# Patient Record
Sex: FEMALE | Race: WHITE | Employment: FULL TIME | URBAN - METROPOLITAN AREA
[De-identification: names, ages, dates, MRNs, and addresses within clinical notes are randomized per-mention and may not be internally consistent; named-entity substitution may affect disease eponyms.]

---

## 2018-06-01 PROBLEM — R91.1 PULMONARY NODULE: Status: ACTIVE | Noted: 2018-06-01

## 2018-07-11 PROBLEM — M35.00 SJOGREN'S SYNDROME (HCC): Status: ACTIVE | Noted: 2018-07-11

## 2018-07-11 PROBLEM — R73.03 PRE-DIABETES: Status: ACTIVE | Noted: 2018-07-11

## 2018-07-11 PROBLEM — E04.1 THYROID NODULE: Status: ACTIVE | Noted: 2018-07-11

## 2018-07-11 PROBLEM — R03.0 PRE-HYPERTENSION: Status: ACTIVE | Noted: 2018-07-11

## 2018-08-18 PROCEDURE — 86803 HEPATITIS C AB TEST: CPT | Performed by: INTERNAL MEDICINE

## 2018-08-20 PROBLEM — N18.30 CKD (CHRONIC KIDNEY DISEASE) STAGE 3, GFR 30-59 ML/MIN (HCC): Status: ACTIVE | Noted: 2018-08-20

## 2018-11-20 ENCOUNTER — LAB ENCOUNTER (OUTPATIENT)
Dept: LAB | Age: 68
End: 2018-11-20
Attending: INTERNAL MEDICINE
Payer: COMMERCIAL

## 2018-11-20 DIAGNOSIS — Z86.2 PERSONAL HISTORY OF DISEASES OF BLOOD AND BLOOD-FORMING ORGANS: ICD-10-CM

## 2018-11-20 DIAGNOSIS — Z79.52 LONG TERM CURRENT USE OF SYSTEMIC STEROIDS: ICD-10-CM

## 2018-11-20 DIAGNOSIS — M35.00 SICCA SYNDROME (HCC): Primary | ICD-10-CM

## 2018-11-20 PROCEDURE — 84443 ASSAY THYROID STIM HORMONE: CPT

## 2018-11-20 PROCEDURE — 86160 COMPLEMENT ANTIGEN: CPT

## 2018-11-20 PROCEDURE — 85025 COMPLETE CBC W/AUTO DIFF WBC: CPT

## 2018-11-20 PROCEDURE — 86235 NUCLEAR ANTIGEN ANTIBODY: CPT

## 2018-11-20 PROCEDURE — 85652 RBC SED RATE AUTOMATED: CPT

## 2018-11-20 PROCEDURE — 86225 DNA ANTIBODY NATIVE: CPT

## 2018-11-20 PROCEDURE — 86038 ANTINUCLEAR ANTIBODIES: CPT

## 2018-11-20 PROCEDURE — 80053 COMPREHEN METABOLIC PANEL: CPT

## 2018-11-29 PROBLEM — M81.0 OSTEOPOROSIS: Status: ACTIVE | Noted: 2018-11-29

## 2019-01-21 PROBLEM — E78.5 HYPERLIPIDEMIA, UNSPECIFIED HYPERLIPIDEMIA TYPE: Status: ACTIVE | Noted: 2019-01-21

## 2019-02-12 PROBLEM — I51.89 DIASTOLIC DYSFUNCTION: Status: ACTIVE | Noted: 2019-02-12

## 2019-02-12 PROBLEM — I35.8 AORTIC VALVE SCLEROSIS: Status: ACTIVE | Noted: 2019-02-12

## 2019-02-12 PROBLEM — I51.7 MILD CONCENTRIC LEFT VENTRICULAR HYPERTROPHY: Status: ACTIVE | Noted: 2019-02-12

## 2019-10-12 PROCEDURE — 82784 ASSAY IGA/IGD/IGG/IGM EACH: CPT | Performed by: SPECIALIST

## 2019-10-12 PROCEDURE — 86256 FLUORESCENT ANTIBODY TITER: CPT | Performed by: SPECIALIST

## 2019-10-14 PROCEDURE — 88305 TISSUE EXAM BY PATHOLOGIST: CPT | Performed by: SPECIALIST

## 2019-10-24 PROBLEM — Z86.79 HISTORY OF ATRIAL TACHYCARDIA: Status: ACTIVE | Noted: 2019-10-24

## 2019-10-24 PROBLEM — N18.30 CKD (CHRONIC KIDNEY DISEASE) STAGE 3, GFR 30-59 ML/MIN (HCC): Status: RESOLVED | Noted: 2018-08-20 | Resolved: 2019-10-24

## 2019-10-24 PROBLEM — R73.03 PRE-DIABETES: Status: RESOLVED | Noted: 2018-07-11 | Resolved: 2019-10-24

## 2020-03-02 PROBLEM — M85.80 OSTEOPENIA: Status: ACTIVE | Noted: 2020-03-02

## 2020-10-21 PROBLEM — I47.19 ATRIAL TACHYCARDIA (HCC): Status: ACTIVE | Noted: 2020-10-21

## 2020-10-21 PROBLEM — D58.2 ELEVATED HEMOGLOBIN: Status: ACTIVE | Noted: 2020-10-21

## 2020-10-21 PROBLEM — I47.1 ATRIAL TACHYCARDIA (HCC): Status: ACTIVE | Noted: 2020-10-21

## 2020-10-21 PROBLEM — M35.00 SICCA (HCC): Status: ACTIVE | Noted: 2020-10-21

## 2020-10-21 PROBLEM — D58.2 ELEVATED HEMOGLOBIN (HCC): Status: ACTIVE | Noted: 2020-10-21

## 2020-11-09 PROBLEM — D58.2 ELEVATED HEMOGLOBIN (HCC): Status: RESOLVED | Noted: 2020-10-21 | Resolved: 2020-11-09

## 2020-11-09 PROBLEM — D58.2 ELEVATED HEMOGLOBIN: Status: RESOLVED | Noted: 2020-10-21 | Resolved: 2020-11-09

## 2021-10-20 PROBLEM — R73.03 PRE-DIABETES: Status: ACTIVE | Noted: 2021-10-20

## 2021-11-02 PROBLEM — I70.0 AORTO-ILIAC ATHEROSCLEROSIS: Status: ACTIVE | Noted: 2021-11-02

## 2021-11-02 PROBLEM — Z86.79 HISTORY OF ATRIAL TACHYCARDIA: Status: RESOLVED | Noted: 2019-10-24 | Resolved: 2021-11-02

## 2021-11-02 PROBLEM — I70.8 AORTO-ILIAC ATHEROSCLEROSIS (HCC): Status: ACTIVE | Noted: 2021-11-02

## 2021-11-02 PROBLEM — M81.0 OSTEOPOROSIS: Status: RESOLVED | Noted: 2018-11-29 | Resolved: 2021-11-02

## 2021-11-02 PROBLEM — I70.8 AORTO-ILIAC ATHEROSCLEROSIS: Status: ACTIVE | Noted: 2021-11-02

## 2021-11-02 PROBLEM — I47.19 ATRIAL TACHYCARDIA (HCC): Status: RESOLVED | Noted: 2020-10-21 | Resolved: 2021-11-02

## 2021-11-02 PROBLEM — I47.1 PAROXYSMAL ATRIAL TACHYCARDIA (HCC): Status: ACTIVE | Noted: 2020-10-21

## 2021-11-02 PROBLEM — I70.0 AORTO-ILIAC ATHEROSCLEROSIS (HCC): Status: ACTIVE | Noted: 2021-11-02

## 2021-11-02 PROBLEM — I47.19 PAROXYSMAL ATRIAL TACHYCARDIA (HCC): Status: ACTIVE | Noted: 2020-10-21

## 2021-11-02 PROBLEM — I47.1 ATRIAL TACHYCARDIA (HCC): Status: RESOLVED | Noted: 2020-10-21 | Resolved: 2021-11-02

## 2021-11-03 PROBLEM — E78.5 DYSLIPIDEMIA (HIGH LDL; LOW HDL): Status: ACTIVE | Noted: 2021-11-03

## 2021-11-03 PROBLEM — I47.19 PAROXYSMAL ATRIAL TACHYCARDIA (HCC): Status: RESOLVED | Noted: 2020-10-21 | Resolved: 2021-11-03

## 2021-11-03 PROBLEM — I47.1 PAROXYSMAL ATRIAL TACHYCARDIA (HCC): Status: RESOLVED | Noted: 2020-10-21 | Resolved: 2021-11-03

## 2022-12-19 ENCOUNTER — LAB ENCOUNTER (OUTPATIENT)
Dept: LAB | Age: 72
End: 2022-12-19
Attending: OTOLARYNGOLOGY
Payer: MEDICARE

## 2022-12-19 DIAGNOSIS — Z01.818 PREOP EXAMINATION: ICD-10-CM

## 2022-12-19 DIAGNOSIS — Z11.59 SCREENING FOR VIRAL DISEASE: ICD-10-CM

## 2022-12-20 LAB — SARS-COV-2 RNA RESP QL NAA+PROBE: NOT DETECTED

## 2022-12-22 ENCOUNTER — HOSPITAL ENCOUNTER (OUTPATIENT)
Dept: ULTRASOUND IMAGING | Facility: HOSPITAL | Age: 72
Discharge: HOME | End: 2022-12-22
Attending: OTOLARYNGOLOGY
Payer: MEDICARE

## 2022-12-22 DIAGNOSIS — E07.9 THYROID DYSFUNCTION: ICD-10-CM

## 2022-12-22 DIAGNOSIS — E04.1 THYROID NODULE: ICD-10-CM

## 2022-12-22 PROCEDURE — 88173 CYTOPATH EVAL FNA REPORT: CPT | Performed by: OTOLARYNGOLOGY

## 2022-12-22 PROCEDURE — 10005 FNA BX W/US GDN 1ST LES: CPT | Performed by: OTOLARYNGOLOGY

## 2025-02-12 RX ORDER — MAGNESIUM 200 MG
TABLET ORAL NIGHTLY PRN
COMMUNITY

## 2025-02-12 RX ORDER — ACETAMINOPHEN 500 MG
500 TABLET ORAL EVERY 6 HOURS PRN
COMMUNITY

## 2025-02-12 RX ORDER — NAPROXEN 500 MG/1
500 TABLET ORAL 2 TIMES DAILY WITH MEALS
COMMUNITY

## 2025-02-12 RX ORDER — TIZANIDINE 2 MG/1
2 TABLET ORAL EVERY 6 HOURS PRN
COMMUNITY

## 2025-02-12 RX ORDER — HYDROCHLOROTHIAZIDE 25 MG/1
25 TABLET ORAL DAILY
COMMUNITY

## 2025-02-23 ENCOUNTER — ANESTHESIA EVENT (OUTPATIENT)
Dept: ENDOSCOPY | Facility: HOSPITAL | Age: 75
End: 2025-02-23
Payer: MEDICARE

## 2025-02-24 ENCOUNTER — ANESTHESIA (OUTPATIENT)
Dept: ENDOSCOPY | Facility: HOSPITAL | Age: 75
End: 2025-02-24
Payer: MEDICARE

## 2025-02-24 ENCOUNTER — HOSPITAL ENCOUNTER (OUTPATIENT)
Facility: HOSPITAL | Age: 75
Setting detail: HOSPITAL OUTPATIENT SURGERY
Discharge: HOME | End: 2025-02-24
Attending: INTERNAL MEDICINE | Admitting: INTERNAL MEDICINE
Payer: MEDICARE

## 2025-02-24 VITALS
HEART RATE: 61 BPM | BODY MASS INDEX: 26.19 KG/M2 | OXYGEN SATURATION: 97 % | WEIGHT: 146 LBS | HEIGHT: 62.5 IN | DIASTOLIC BLOOD PRESSURE: 60 MMHG | RESPIRATION RATE: 18 BRPM | SYSTOLIC BLOOD PRESSURE: 163 MMHG | TEMPERATURE: 99 F

## 2025-02-24 PROCEDURE — 88307 TISSUE EXAM BY PATHOLOGIST: CPT | Performed by: INTERNAL MEDICINE

## 2025-02-24 PROCEDURE — 0DB98ZX EXCISION OF DUODENUM, VIA NATURAL OR ARTIFICIAL OPENING ENDOSCOPIC, DIAGNOSTIC: ICD-10-PCS | Performed by: INTERNAL MEDICINE

## 2025-02-24 PROCEDURE — 88305 TISSUE EXAM BY PATHOLOGIST: CPT | Performed by: INTERNAL MEDICINE

## 2025-02-24 PROCEDURE — 0FBG8ZX EXCISION OF PANCREAS, VIA NATURAL OR ARTIFICIAL OPENING ENDOSCOPIC, DIAGNOSTIC: ICD-10-PCS | Performed by: INTERNAL MEDICINE

## 2025-02-24 RX ORDER — INDOMETHACIN 100 MG
SUPPOSITORY, RECTAL RECTAL
Status: DISCONTINUED | OUTPATIENT
Start: 2025-02-24 | End: 2025-02-24

## 2025-02-24 RX ORDER — SODIUM CHLORIDE, SODIUM LACTATE, POTASSIUM CHLORIDE, CALCIUM CHLORIDE 600; 310; 30; 20 MG/100ML; MG/100ML; MG/100ML; MG/100ML
INJECTION, SOLUTION INTRAVENOUS CONTINUOUS
Status: DISCONTINUED | OUTPATIENT
Start: 2025-02-24 | End: 2025-02-24

## 2025-02-24 RX ORDER — LIDOCAINE HYDROCHLORIDE 10 MG/ML
INJECTION, SOLUTION EPIDURAL; INFILTRATION; INTRACAUDAL; PERINEURAL AS NEEDED
Status: DISCONTINUED | OUTPATIENT
Start: 2025-02-24 | End: 2025-02-24 | Stop reason: SURG

## 2025-02-24 RX ORDER — INDOMETHACIN 100 MG
SUPPOSITORY, RECTAL RECTAL
Status: DISCONTINUED
Start: 2025-02-24 | End: 2025-02-24

## 2025-02-24 RX ADMIN — LIDOCAINE HYDROCHLORIDE 25 MG: 10 INJECTION, SOLUTION EPIDURAL; INFILTRATION; INTRACAUDAL; PERINEURAL at 10:10:00

## 2025-02-24 RX ADMIN — SODIUM CHLORIDE, SODIUM LACTATE, POTASSIUM CHLORIDE, CALCIUM CHLORIDE: 600; 310; 30; 20 INJECTION, SOLUTION INTRAVENOUS at 10:07:00

## 2025-02-24 NOTE — ANESTHESIA PREPROCEDURE EVALUATION
PRE-OP EVALUATION    Patient Name: Dorina Garcia    Admit Diagnosis: PANCREATIC DUCT DILATED, WEIGHT LOSS, ABNORMAL CT OF THE ABDOMEN    Pre-op Diagnosis: PANCREATIC DUCT DILATED, WEIGHT LOSS, ABNORMAL CT OF THE ABDOMEN    UPPER ENDOSCOPIC ULTRASOUND (EUS) WITH FINE NEEDLE ASPIRATION    Anesthesia Procedure: UPPER ENDOSCOPIC ULTRASOUND (EUS) WITH FINE NEEDLE ASPIRATION    Surgeons and Role:     * Kevin Gonzalez MD - Primary    Pre-op vitals reviewed.  Temp: 98.8 °F (37.1 °C)  Pulse: 78  Resp: 16  BP: 173/70  SpO2: 96 %  Body mass index is 26.28 kg/m².    Current medications reviewed.  Hospital Medications:   lactated ringers infusion   Intravenous Continuous       Outpatient Medications:   Prescriptions Prior to Admission[1]    Allergies: Doxycycline      Anesthesia Evaluation        Anesthetic Complications  (-) history of anesthetic complications         GI/Hepatic/Renal    Negative GI/hepatic/renal ROS.                             Cardiovascular  Comment: TTE 2019 with normal biventricular fxn, no significant valve dz                (+) hypertension                  (+) dysrhythmias (paroxysmal SVT) and SVT                  Endo/Other           (+) hypothyroidism                       Pulmonary    Negative pulmonary ROS.                       Neuro/Psych                                      Past Surgical History:   Procedure Laterality Date    Foot/toes surgery proc unlisted      bunionectomy & tailor's bunionectomy, b/l    Forearm/wrist surgery unlisted      carpal tunnel syndrome, b/l    Leg/ankle surgery proc unlisted      scope, both knees     Social History     Socioeconomic History    Marital status:    Tobacco Use    Smoking status: Never    Smokeless tobacco: Never   Vaping Use    Vaping status: Never Used   Substance and Sexual Activity    Alcohol use: Not Currently    Drug use: No   Other Topics Concern    Seat Belt Yes     History   Drug Use No     Available pre-op labs reviewed.                Airway      Mallampati: II  Mouth opening: 3 FB  TM distance: 4 - 6 cm  Neck ROM: full Cardiovascular      Rhythm: regular  Rate: normal     Dental    Dentition appears grossly intact         Pulmonary      Breath sounds clear to auscultation bilaterally.               Other findings              ASA: 2   Plan: MAC  NPO status verified and patient meets guidelines.    Post-procedure pain management plan discussed with surgeon and patient.      Plan/risks discussed with: patient                Present on Admission:  **None**             [1]   Medications Prior to Admission   Medication Sig Dispense Refill Last Dose/Taking    naproxen 500 MG Oral Tab Take 1 tablet (500 mg total) by mouth 2 (two) times daily with meals.   2/23/2025    acetaminophen 500 MG Oral Tab Take 1 tablet (500 mg total) by mouth every 6 (six) hours as needed for Pain.   2/23/2025    hydroCHLOROthiazide 25 MG Oral Tab Take 1 tablet (25 mg total) by mouth daily.   2/23/2025    Magnesium 200 MG Oral Tab Take by mouth nightly as needed.   2/23/2025    Romosozumab-aqqg ([facility]) Inject into the skin every 30 (thirty) days. Gets in office   Past Month    UNITHROID 25 MCG Oral Tab Take one tablet by mouth every other day 45 tablet 5 Past Week    PredniSONE 5 MG Oral Tab 1 TABLET DAILY   2/23/2025    Cholecalciferol (VITAMIN D) 2000 UNITS Oral Cap 2 capsules daily   2/23/2025    tiZANidine 2 MG Oral Tab Take 1 tablet (2 mg total) by mouth every 6 (six) hours as needed. Taking half a tablet   More than a month

## 2025-02-24 NOTE — OPERATIVE REPORT
[facility] OPERATIVE REPORT   PATIENT NAME: Dorina Garcia  MRN: [id number]  DATE OF OPERATION: 2/24/2025  PREOPERATIVE DIAGNOSIS: weight loss, pancreatic duct dilation  POSTOPERATIVE DIAGNOSIS:    1.  EGD- hiatal hernia   2.  EUS    - a 7mm pancreatic duct stricture with marked upstream ductal dilation and pancreatic atrophy      - stricture s/p FNB     - single calcification in neck of pancreas    - small hepatic cysts  PROCEDURE PERFORMED: upper endoscopy/ ENDOSCOPIC ultrasound  SEDATION MEDICATIONS: MAC  PREOPERATIVE MEDICATIONS: Unasyn 3gm IVPD; indomethacin 100 mg AK; 500cc Lactated Ringer solution IV  PREPROCEDURE ASSESSMENT: The indication for this procedure is to assess for . The patient was identified by myself and nursing staff in the exam room. Informed consent was obtained. The patient was seen in clinic and a full H&P was obtained. On brief physical examination, airway is patent. Chest is clear. Heart has regular rate and rhythm. Abdomen is soft, nontender with good bowel sounds. A medication list was taken by nursing today and reviewed by myself. The patient is an ASA grade 2.   PROCEDURE NOTE: The procedure was completed without difficulty. The patient tolerated the procedure well.   Upper endoscopy (EGD):  The endoscope was inserted through the mouth and advanced to the level of the duodenum, 3rd portion.  Visualized portion of the esophagus, stomach including antrum, body, fundus and cardiac, and duodenum were normal. Duodenal biopsies were taken.  Endoscopic ultrasound (EUS):  Endoscopic ultrasound was performed using the linear echoendoscope.  Images were obtained.    LIVER: Left lobe of the liver was visualized and no mass or lesions seen except for small hepatic cysts.  No intrahepatic duct dilation was noted.  Portal vein was noted to come out of the liver and the portal confluence was seen and pancreas was noted.   PANCREAS:  Pancreatic neck, body, and tail were interrogated from the  gastric body while the neck, head and uncinate were examined from the 1st and 2nd duodenum.  PD  - neck: 7.1 mm  - body: 5 mm  - tail: 4 mm  - head: 3 mm  Pancreas divisum: no  Chronic pancreatitis changes: possibly; there was a calcification in the neck of the pancreas. This was not intraductal. Since pancreatic parenchyma was atrophic the body and tail, it was hard to say if there was lobulation to suggest chronic pancreatitis.  However, head and uncinate were normal without features of chronic pancreatitis.  Neoplasm:  no; there was a 7mm stricture that was sampled with 25G FNB needle.  Using color flow doppler to make sure there no intervening vessels, a 25G FNB needle was used to pucnture into the lesion. 2 passes were made.  Adequate tissue was obtained for cytology.  Cysts:   no  Biliary Tree: Normal throughout  - stones:  no  GALLBLADDER:  visualized and was normal without stones or sludge  CELIAC AXIS:  visualized without lymphadenopathy  L ADRENAL GLAND:  visualized   L KIDNEY:  visualized   MEDIASTINUM:  visualized without lymphadenopathy  Scope was withdrawn from the patient and patient tolerated the procedure well.  FINDINGS   Pancreas duct stricture in the neck area with marked upstream pancreas ductal dilation of the proximal body.  There was gradual tapering towards the tail.  There was significant pancreatic gland atrophy upstream to the stricture.  The stricture was just proximal to the level of the portal confluence.  No discrete mass was noted.  Biopsies were taken of the stricture area.  Small blood was noted in the pancreatic duct.  No active bleeding seen.  RECOMMENDATIONS: Pancreatic duct stricture of unknown etiology.  There was a single calcification seen in the neck of the pancreas.  No intraductal calcifications to suggest ductal stones.  My concern is that patient has early pancreatic neoplasm.  Will review pathology.  Will monitor HbA1c closely.  Close surveillance will be needed if  pathology is unremarkable  DISCHARGE:  On discharge, the patient was given an after-visit summary detailing the procedure, findings, followup plans, and an updated medication list.     Thank you very much for the consultation.  I really appreciate it.    Kevin Gonzalez MD

## 2025-02-24 NOTE — ANESTHESIA POSTPROCEDURE EVALUATION
[facility]    Dorina Garcia Patient Status:  Hospital Outpatient Surgery   Age/Gender 74 year old female MRN [id number]   Location [facility] ENDOSCOPY [facility] Attending Kevin Gonzalez MD   Hosp Day # 0 PCP Irina Cortez MD       Anesthesia Post-op Note    UPPER ENDOSCOPIC ULTRASOUND (EUS) WITH FINE NEEDLE BIOPSY AND ENDOSCOPIC BIOPSIES    Procedure Summary       Date: 02/24/25 Room / Location:  ENDOSCOPY 03 / [facility] END[facility]COPY    Anesthesia Start: 1006 Anesthesia Stop: 1034    Procedure: UPPER ENDOSCOPIC ULTRASOUND (EUS) WITH FINE NEEDLE BIOPSY AND ENDOSCOPIC BIOPSIES Diagnosis: (PANCREATIC DUCT DILATION)    Surgeons: Kevin Gonzalez MD Anesthesiologist: Oscar Finn MD    Anesthesia Type: MAC ASA Status: 2            Anesthesia Type: MAC    Vitals Value Taken Time   /55 02/24/25 1034   Temp  02/24/25 1036   Pulse 71 02/24/25 1034   Resp 18 02/24/25 1034   SpO2 96 % 02/24/25 1034           Patient Location: PACU    Anesthesia Type: general    Airway Patency: patent    Postop Pain Control: adequate    Mental Status: preanesthetic baseline    Nausea/Vomiting: none    Cardiopulmonary/Hydration status: stable euvolemic    Complications: no apparent anesthesia related complications    Postop vital signs: stable    Dental Exam: Unchanged from Preop    Patient to be discharged from PACU when criteria met.

## 2025-02-24 NOTE — ANESTHESIA POSTPROCEDURE EVALUATION
[facility]    Dorina Garcia Patient Status:  Hospital Outpatient Surgery   Age/Gender 74 year old female MRN [id number]   Location [facility] ENDOSCOPY PAIN CENTER Attending Kevin Gonzalez MD   Hosp Day # 0 PCP Irina Cortez MD       Anesthesia Post-op Note    UPPER ENDOSCOPIC ULTRASOUND (EUS) WITH FINE NEEDLE BIOPSY AND ENDOSCOPIC BIOPSIES    Procedure Summary       Date: 02/24/25 Room / Location:  ENDOSCOPY 03 / [facility]    Anesthesia Start: 1006 Anesthesia Stop: 1034    Procedure: UPPER ENDOSCOPIC ULTRASOUND (EUS) WITH FINE NEEDLE BIOPSY AND ENDOSCOPIC BIOPSIES Diagnosis: (PANCREATIC DUCT DILATION)    Surgeons: Kevin Gonzalez MD Anesthesiologist: Oscar Finn MD    Anesthesia Type: MAC ASA Status: 2            Anesthesia Type: MAC    Vitals Value Taken Time   /55 02/24/25 1034   Temp  02/24/25 1035   Pulse 71 02/24/25 1034   Resp 18 02/24/25 1034   SpO2 96 % 02/24/25 1034           Patient Location: Same Day Surgery    Anesthesia Type: MAC    Airway Patency: patent    Postop Pain Control: adequate    Mental Status: mildly sedated but able to meaningfully participate in the post-anesthesia evaluation    Nausea/Vomiting: none    Cardiopulmonary/Hydration status: stable euvolemic    Complications: no apparent anesthesia related complications    Postop vital signs: stable    Dental Exam: Unchanged from Preop    Patient to be discharged from PACU when criteria met.

## 2025-02-24 NOTE — H&P
History & Physical Examination    Patient Name: Dorina Garcia  MRN: [id number]  CSN: [id number]  YOB: 1950    Diagnosis: PANCREATIC DUCT DILATED, WEIGHT LOSS, ABNORMAL CT OF THE ABDOMEN       Present Illness:  Dorina Garcia is a 74 year old female is here PANCREATIC DUCT DILATED, WEIGHT LOSS, ABNORMAL CT OF THE ABDOMEN.    Body mass index is 26.28 kg/m².    Past Medical History:    Arrhythmia    palpitations    Atrial tachycardia (HCC)    Per note 10/24/19    Back problem    Disorder of thyroid    Diverticulosis of large intestine    High blood pressure    History of atrial tachycardia    Hypothyroidism    Osteoarthritis    Osteoporosis    Sjogren's disease (HCC)    following with Rheumatology     Thrombocytopenia    Visual impairment    glasses       Procedure: EUS    Physician Pre-Sedation Assessment    Pre-Sedation Assessment:    Sedation History: Airway Assessed    ASA Classification: 2. Patient with mild systemic disease    Cardiac:    Respiratory:    Abdomen:      Plan: MAC        Current Facility-Administered Medications   Medication Dose Route Frequency    lactated ringers infusion   Intravenous Continuous       Allergies: Allergies[1]    Past Surgical History:   Procedure Laterality Date    Foot/toes surgery proc unlisted      bunionectomy & tailor's bunionectomy, b/l    Forearm/wrist surgery unlisted      carpal tunnel syndrome, b/l    Leg/ankle surgery proc unlisted      scope, both knees     Family History   Problem Relation Age of Onset    Heart Disorder Father         CAD s/p bypass    Other (bunion) Father     Other (dementia) Father     Diabetes Mother     Stroke Mother     Heart Disorder Mother     Lipids Mother     Other (Other) Brother         parkinsons     Social History     Tobacco Use    Smoking status: Never    Smokeless tobacco: Never   Substance Use Topics    Alcohol use: Not Currently       SYSTEM Check if Review is Normal Check if Physical Exam is Normal If not normal,  please explain:   HEENT [x ] [x ]    NECK & BACK [x ] [x ]    HEART [x ] [x ]    LUNGS [x ] [x ]    ABDOMEN [x ] [x ]    UROGENITAL [ ] [ ]    EXTREMITIES [ ] [ ]    OTHER        [ x ] I have discussed the risks and benefits and alternatives with the patient/family.  They understand and agree to proceed with plan of care.  [ x ] I have reviewed the History and Physical done within the last 30 days.  Any changes noted above.    Kevin Gonzalez MD  2/24/2025  9:51 AM             [1]   Allergies  Allergen Reactions    Doxycycline NAUSEA AND VOMITING

## 2025-02-24 NOTE — DISCHARGE INSTRUCTIONS
Home Care Instructions ENDOSCOPIC ULTRASOUND (EUS)  with Sedation    Diet:  - Start with CLEAR LIQUID DIET FOR 24 HOURS.   - Resume your regular diet as tolerated unless otherwise instructed.  - Do not drink alcohol today.    Medication:  - If you have questions about resuming your normal medications, please contact your Primary Care Physician.    Activities:  - Take it easy today. Do not return to work today.  - Do not drive today.  - Do not operate any machinery today (including kitchen equipment).    ENDOSCOPIC ULTRASOUND (EUS):  - You may have a sore throat for 2-3 days following the exam. This is normal. Gargling with warm salt water (1/2 tsp salt to 1 glass warm water) or using throat lozenges will help.  - If you experience any sharp pain in your neck, abdomen or chest, vomiting of blood, oral temperature over 100 degrees Fahrenheit, light-headedness or dizziness, or any other problems, contact your doctor.    **If unable to reach your doctor, please go to the [facility] Emergency Room**    - Your referring physician will receive a full report of your examination.  - If you do not hear from your doctor's office within two weeks of your biopsy, please call them for your results.

## 2025-05-28 ENCOUNTER — HOSPITAL ENCOUNTER (OUTPATIENT)
Facility: HOSPITAL | Age: 75
Setting detail: HOSPITAL OUTPATIENT SURGERY
Discharge: HOME | End: 2025-05-28
Attending: INTERNAL MEDICINE | Admitting: INTERNAL MEDICINE
Payer: MEDICARE

## 2025-05-28 ENCOUNTER — ANESTHESIA EVENT (OUTPATIENT)
Dept: ENDOSCOPY | Facility: HOSPITAL | Age: 75
End: 2025-05-28
Payer: MEDICARE

## 2025-05-28 ENCOUNTER — ANESTHESIA (OUTPATIENT)
Dept: ENDOSCOPY | Facility: HOSPITAL | Age: 75
End: 2025-05-28
Payer: MEDICARE

## 2025-05-28 VITALS
HEIGHT: 62.5 IN | HEART RATE: 61 BPM | WEIGHT: 146 LBS | TEMPERATURE: 98 F | OXYGEN SATURATION: 97 % | RESPIRATION RATE: 18 BRPM | SYSTOLIC BLOOD PRESSURE: 148 MMHG | BODY MASS INDEX: 26.19 KG/M2 | DIASTOLIC BLOOD PRESSURE: 63 MMHG

## 2025-05-28 RX ORDER — HYDROCODONE BITARTRATE AND ACETAMINOPHEN 5; 325 MG/1; MG/1
1 TABLET ORAL ONCE AS NEEDED
Status: DISCONTINUED | OUTPATIENT
Start: 2025-05-28 | End: 2025-05-28

## 2025-05-28 RX ORDER — HYDROMORPHONE HYDROCHLORIDE 1 MG/ML
0.2 INJECTION, SOLUTION INTRAMUSCULAR; INTRAVENOUS; SUBCUTANEOUS EVERY 5 MIN PRN
Status: DISCONTINUED | OUTPATIENT
Start: 2025-05-28 | End: 2025-05-28

## 2025-05-28 RX ORDER — SODIUM CHLORIDE, SODIUM LACTATE, POTASSIUM CHLORIDE, CALCIUM CHLORIDE 600; 310; 30; 20 MG/100ML; MG/100ML; MG/100ML; MG/100ML
INJECTION, SOLUTION INTRAVENOUS CONTINUOUS
Status: DISCONTINUED | OUTPATIENT
Start: 2025-05-28 | End: 2025-05-28

## 2025-05-28 RX ORDER — HYDROCODONE BITARTRATE AND ACETAMINOPHEN 5; 325 MG/1; MG/1
2 TABLET ORAL ONCE AS NEEDED
Status: DISCONTINUED | OUTPATIENT
Start: 2025-05-28 | End: 2025-05-28

## 2025-05-28 RX ORDER — ACETAMINOPHEN 500 MG
1000 TABLET ORAL ONCE AS NEEDED
Status: DISCONTINUED | OUTPATIENT
Start: 2025-05-28 | End: 2025-05-28

## 2025-05-28 RX ORDER — LABETALOL HYDROCHLORIDE 5 MG/ML
5 INJECTION, SOLUTION INTRAVENOUS EVERY 5 MIN PRN
Status: DISCONTINUED | OUTPATIENT
Start: 2025-05-28 | End: 2025-05-28

## 2025-05-28 RX ORDER — HYDROMORPHONE HYDROCHLORIDE 1 MG/ML
0.4 INJECTION, SOLUTION INTRAMUSCULAR; INTRAVENOUS; SUBCUTANEOUS EVERY 5 MIN PRN
Status: DISCONTINUED | OUTPATIENT
Start: 2025-05-28 | End: 2025-05-28

## 2025-05-28 RX ORDER — NALOXONE HYDROCHLORIDE 0.4 MG/ML
0.08 INJECTION, SOLUTION INTRAMUSCULAR; INTRAVENOUS; SUBCUTANEOUS AS NEEDED
Status: DISCONTINUED | OUTPATIENT
Start: 2025-05-28 | End: 2025-05-28

## 2025-05-28 RX ORDER — HYDROMORPHONE HYDROCHLORIDE 1 MG/ML
0.6 INJECTION, SOLUTION INTRAMUSCULAR; INTRAVENOUS; SUBCUTANEOUS EVERY 5 MIN PRN
Status: DISCONTINUED | OUTPATIENT
Start: 2025-05-28 | End: 2025-05-28

## 2025-05-28 RX ADMIN — SODIUM CHLORIDE, SODIUM LACTATE, POTASSIUM CHLORIDE, CALCIUM CHLORIDE: 600; 310; 30; 20 INJECTION, SOLUTION INTRAVENOUS at 11:46:00

## 2025-05-28 NOTE — OPERATIVE REPORT
[facility] OPERATIVE REPORT   PATIENT NAME: Dorina Garcia  MRN: [id number]  DATE OF OPERATION: 5/28/2025  PREOPERATIVE DIAGNOSIS: persistent pancreatic stricture without upstream pancreatic ductal dilation and atrophy- normal HBA1c and CA 19-9; patient resistant to ERCP due to other health issues  POSTOPERATIVE DIAGNOSIS:    1.  7mm PD stricture with marked upstream PD dilation to 8mm without overt mass- some nodularity was noted in this area without focal mass; distal PD tapered toward the tail with marked glandular atrophy   2.  Numerous gallstones  PROCEDURE PERFORMED: upper endoscopy/ ENDOSCOPIC ultrasound  SEDATION MEDICATIONS: MAC  PREOPERATIVE MEDICATIONS:   PREPROCEDURE ASSESSMENT: The indication for this procedure is to assess for cysts. The patient was identified by myself and nursing staff in the exam room. Informed consent was obtained. The patient was seen in clinic and a full H&P was obtained. On brief physical examination, airway is patent. Chest is clear. Heart has regular rate and rhythm. Abdomen is soft, nontender with good bowel sounds. A medication list was taken by nursing today and reviewed by myself. The patient is an ASA grade 2.   PROCEDURE NOTE: The procedure was completed without difficulty. The patient tolerated the procedure well.   Upper endoscopy (EGD):  The endoscope was inserted through the mouth and advanced to the level of the duodenum, 3rd portion.  Visualized portion of the esophagus, stomach including antrum, body, fundus and cardiac, and duodenum were normal.  Endoscopic ultrasound (EUS):  Endoscopic ultrasound was performed using the linear echoendoscope.  Images were obtained.    LIVER: Left lobe of the liver was visualized and no mass or lesions seen.  No intrahepatic duct dilation was noted.  Portal vein was noted to come out of the liver and the portal confluence was seen and pancreas was noted.   PANCREAS:  Pancreatic neck, body, and tail were interrogated from the  gastric body while the neck, head and uncinate were examined from the 1st and 2nd duodenum.  There was a normal-appearing pancreatic duct in the head with a sharp cut off in the neck around the portal confluence.  No obvious mass lesion was seen.  This was carefully examined from the duodenal bulb.  Just upstream to the sharp cut off there was marked upstream pancreatic ductal dilation up to 8 mm that tapered distally towards the tail.  The stricture length was about 7 mm. No pancreas divisum was appreciated.  Pancreatic parenchyma in the body and tail was significantly atrophic.  MEDIASTINUM: not visualized without lymphadenopathy  Scope was withdrawn from the patient and patient tolerated the procedure well.  FINDINGS   No pancreatic neoplasm was seen.  Intraductal malignancy cannot be ruled out.  No extrinsic compression was seen.  RECOMMENDATIONS: Repeat HbA1c and CA 19-9 in 6 months and office visit to follow.  DISCHARGE:  On discharge, the patient was given an after-visit summary detailing the procedure, findings, followup plans, and an updated medication list.     Thank you very much for the consultation.  I really appreciate it.    Kevin Gonzalez MD

## 2025-05-28 NOTE — H&P
History & Physical Examination    Patient Name: Dorina Garcia  MRN: [id number]  [facility]: [id number]  YOB: 1950    Diagnosis: K86.89 PANCREATIC DUCT DILATED, R63.4 WEIGHT LOSS, K86.89 PANCREATIC DUCT STRICTURE       Present Illness:  Dorina Garcia is a 75 year old female is here K86.89 PANCREATIC DUCT DILATED, R63.4 WEIGHT LOSS, K86.89 PANCREATIC DUCT STRICTURE.    Body mass index is 26.28 kg/m².    Past Medical History[1]    Procedure: EUS    Physician Pre-Sedation Assessment    Pre-Sedation Assessment:    Sedation History: Airway Assessed    ASA Classification: 2. Patient with mild systemic disease    Cardiac:    Respiratory:    Abdomen:      Plan: MAC        Current Hospital Medications[2]    Allergies: Allergies[3]    Past Surgical History[4]  Family History[5]  Social History     Tobacco Use    Smoking status: Never    Smokeless tobacco: Never   Substance Use Topics    Alcohol use: Not Currently       SYSTEM Check if Review is Normal Check if Physical Exam is Normal If not normal, please explain:   HEENT [x ] [x ]    NECK & BACK [x ] [x ]    HEART [x ] [x ]    LUNGS [x ] [x ]    ABDOMEN [x ] [x ]    UROGENITAL [ ] [ ]    EXTREMITIES [ ] [ ]    OTHER        [ x ] I have discussed the risks and benefits and alternatives with the patient/family.  They understand and agree to proceed with plan of care.  [ x ] I have reviewed the History and Physical done within the last 30 days.  Any changes noted above.    Kevin Gonzalez MD  5/28/2025  11:23 AM             [1]   Past Medical History:   Arrhythmia    palpitations    Atrial tachycardia (HCC)    Per note 10/24/19    Back problem    Disorder of thyroid    Diverticulosis of large intestine    High blood pressure    History of atrial tachycardia    Hypothyroidism    Osteoarthritis    Osteoporosis    Sjogren's disease (HCC)    following with Rheumatology     Thrombocytopenia    Visual impairment    glasses   [2]   Current Facility-Administered Medications    Medication Dose Route Frequency    lactated ringers infusion   Intravenous Continuous   [3]   Allergies  Allergen Reactions    Doxycycline NAUSEA AND VOMITING   [4]   Past Surgical History:  Procedure Laterality Date    Foot/toes surgery proc unlisted      bunionectomy & tailor's bunionectomy, b/l    Forearm/wrist surgery unlisted      carpal tunnel syndrome, b/l    Leg/ankle surgery proc unlisted      scope, both knees   [5]   Family History  Problem Relation Age of Onset    Heart Disorder Father         CAD s/p bypass    Other (bunion) Father     Other (dementia) Father     Diabetes Mother     Stroke Mother     Heart Disorder Mother     Lipids Mother     Other (Other) Brother         parkinsons

## 2025-05-28 NOTE — ANESTHESIA PREPROCEDURE EVALUATION
PRE-OP EVALUATION    Patient Name: Dorina Garcia    Admit Diagnosis: K86.89 PANCREATIC DUCT DILATED, R63.4 WEIGHT LOSS, K86.89 PANCREATIC DUCT STRICTURE    Pre-op Diagnosis: K86.89 PANCREATIC DUCT DILATED, R63.4 WEIGHT LOSS, K86.89 PANCREATIC DUCT STRICTURE    ENDOSCOPIC ULTRASOUND (EUS)    Anesthesia Procedure: ENDOSCOPIC ULTRASOUND (EUS)    Surgeons and Role:     * Kevin Gonzalez MD - Primary    Pre-op vitals reviewed.  Temp: 98.3 °F (36.8 °C)  Pulse: 68  Resp: 20  BP: 146/62  SpO2: 99 %  Body mass index is 26.28 kg/m².    Current medications reviewed.  Hospital Medications:  Current Medications[1]    Outpatient Medications:   Prescriptions Prior to Admission[2]    Allergies: Doxycycline      Anesthesia Evaluation    Patient summary reviewed.    Anesthetic Complications  (-) history of anesthetic complications         GI/Hepatic/Renal                                 Cardiovascular        Exercise tolerance: good     MET: >4      (+) hypertension     (-) CAD  (-) past MI  (-) CABG/stent                            Endo/Other      (-) diabetes     (+) hypothyroidism  (-) hyperthyroidism                     Pulmonary      (-) asthma                     Neuro/Psych          (-) CVA   (-) TIA                         Past Surgical History[3]  Social Hx on file[4]  History   Drug Use No     Available pre-op labs reviewed.               Airway      Mallampati: III  Mouth opening: >3 FB  TM distance: > 6 cm  Neck ROM: full Cardiovascular      Rhythm: regular       Dental             Pulmonary    Pulmonary exam normal.                 Other findings              ASA: 3   Plan: MAC  NPO status verified and           Plan/risks discussed with: patient          Monitored anesthesia care explained, pt is aware that they may remember proceedings from the procedure and that this is normal. Discussed potential for conversion to general anesthesia if unable to tolerate procedure in MAC   Plan/risks discussed with: patient          Present on Admission:  **None**             [1]    lactated ringers infusion   Intravenous Continuous   [2]   Medications Prior to Admission   Medication Sig Dispense Refill Last Dose/Taking    naproxen 500 MG Oral Tab Take 1 tablet (500 mg total) by mouth in the morning and 1 tablet (500 mg total) in the evening. Take with meals.   5/27/2025    acetaminophen 500 MG Oral Tab Take 1 tablet (500 mg total) by mouth every 6 (six) hours as needed for Pain.   5/27/2025    hydroCHLOROthiazide 25 MG Oral Tab Take 1 tablet (25 mg total) by mouth in the morning.   5/27/2025    Magnesium 200 MG Oral Tab Take by mouth nightly as needed.   5/27/2025    Romosozumab-aqqg ([facility]EN[facility] SC) Inject into the skin every 30 (thirty) days. Gets in office   Past Week    UNITHROID 25 MCG Oral Tab Take one tablet by mouth every other day 45 tablet 5 5/28/2025 Morning    PredniSONE 5 MG Oral Tab Take by mouth.   5/27/2025    Cholecalciferol (VITAMIN D) 2000 UNITS Oral Cap Take by mouth.   5/27/2025   [3]   Past Surgical History:  Procedure Laterality Date    Foot/toes surgery proc unlisted      bunionectomy & tailor's bunionectomy, b/l    Forearm/wrist surgery unlisted      carpal tunnel syndrome, b/l    Leg/ankle surgery proc unlisted      scope, both knees   [4]   Social History  Socioeconomic History    Marital status:    Tobacco Use    Smoking status: Never    Smokeless tobacco: Never   Vaping Use    Vaping status: Never Used   Substance and Sexual Activity    Alcohol use: Not Currently    Drug use: No   Other Topics Concern    Seat Belt Yes

## 2025-05-28 NOTE — DISCHARGE INSTRUCTIONS

## 2025-05-28 NOTE — ANESTHESIA POSTPROCEDURE EVALUATION
[facility]    Dorina Garcia Patient Status:  Hospital Outpatient Surgery   Age/Gender 75 year old female MRN [id number]   Location [facility] Attending Kevin Gonzalez MD   Hosp Day # 0 PCP Irina Cortez MD       Anesthesia Post-op Note    ENDOSCOPIC ULTRASOUND (EUS)    Procedure Summary       Date: 05/28/25 Room / Location:  ENDOSCOPY 03 / [facility]    Anesthesia Start: 1124 Anesthesia Stop: 1146    Procedure: ENDOSCOPIC ULTRASOUND (EUS) Diagnosis: (pancreatic sticture , dilated PD)    Surgeons: Kevin Gonzalez MD Anesthesiologist: Lux Gilbert MD    Anesthesia Type: MAC ASA Status: 3            Anesthesia Type: MAC    Vitals Value Taken Time   /62 05/28/25 12:06   Temp  05/28/25 12:06   Pulse 59 05/28/25 12:06   Resp 18 05/28/25 12:00   SpO2 96 % 05/28/25 12:06   Vitals shown include unfiled device data.        Patient Location: Endoscopy    Anesthesia Type: MAC    Airway Patency: patent    Postop Pain Control: adequate    Mental Status: preanesthetic baseline    Nausea/Vomiting: none    Cardiopulmonary/Hydration status: stable euvolemic    Complications: no apparent anesthesia related complications    Postop vital signs: stable    Dental Exam: Unchanged from Preop    Patient to be discharged from PACU when criteria met.

## 2025-08-11 ENCOUNTER — HOSPITAL ENCOUNTER (EMERGENCY)
Facility: HOSPITAL | Age: 75
Discharge: HOME | End: 2025-08-11
Attending: EMERGENCY MEDICINE

## 2025-08-11 VITALS
WEIGHT: 147 LBS | DIASTOLIC BLOOD PRESSURE: 67 MMHG | HEIGHT: 62 IN | BODY MASS INDEX: 27.05 KG/M2 | OXYGEN SATURATION: 94 % | RESPIRATION RATE: 17 BRPM | TEMPERATURE: 98 F | HEART RATE: 59 BPM | SYSTOLIC BLOOD PRESSURE: 123 MMHG

## 2025-08-11 DIAGNOSIS — N39.0 URINARY TRACT INFECTION WITHOUT HEMATURIA, SITE UNSPECIFIED: Primary | ICD-10-CM

## 2025-08-11 LAB
ALBUMIN SERPL-MCNC: 4.7 G/DL (ref 3.2–4.8)
ALBUMIN/GLOB SERPL: 1.3 (ref 1–2)
ALP LIVER SERPL-CCNC: 61 U/L (ref 55–142)
ALT SERPL-CCNC: 13 U/L (ref 10–49)
ANION GAP SERPL CALC-SCNC: 12 MMOL/L (ref 0–18)
AST SERPL-CCNC: 17 U/L (ref ?–34)
BASOPHILS # BLD AUTO: 0.01 X10(3) UL (ref 0–0.2)
BASOPHILS NFR BLD AUTO: 0.1 %
BILIRUB SERPL-MCNC: 0.2 MG/DL (ref 0.2–1.1)
BILIRUB UR QL STRIP.AUTO: NEGATIVE
BUN BLD-MCNC: 23 MG/DL (ref 9–23)
CALCIUM BLD-MCNC: 9.9 MG/DL (ref 8.7–10.6)
CHLORIDE SERPL-SCNC: 103 MMOL/L (ref 98–112)
CO2 SERPL-SCNC: 24 MMOL/L (ref 21–32)
CREAT BLD-MCNC: 1.05 MG/DL (ref 0.55–1.02)
EGFRCR SERPLBLD CKD-EPI 2021: 55 ML/MIN/1.73M2 (ref 60–?)
EOSINOPHIL # BLD AUTO: 0.08 X10(3) UL (ref 0–0.7)
EOSINOPHIL NFR BLD AUTO: 0.6 %
ERYTHROCYTE [DISTWIDTH] IN BLOOD BY AUTOMATED COUNT: 13 %
GLOBULIN PLAS-MCNC: 3.5 G/DL (ref 2–3.5)
GLUCOSE BLD-MCNC: 133 MG/DL (ref 70–99)
GLUCOSE UR STRIP.AUTO-MCNC: NORMAL MG/DL
HCT VFR BLD AUTO: 35.4 % (ref 35–48)
HGB BLD-MCNC: 11.6 G/DL (ref 12–16)
IMM GRANULOCYTES # BLD AUTO: 0.05 X10(3) UL (ref 0–1)
IMM GRANULOCYTES NFR BLD: 0.4 %
KETONES UR STRIP.AUTO-MCNC: NEGATIVE MG/DL
LEUKOCYTE ESTERASE UR QL STRIP.AUTO: 500
LYMPHOCYTES # BLD AUTO: 1.02 X10(3) UL (ref 1–4)
LYMPHOCYTES NFR BLD AUTO: 8 %
MCH RBC QN AUTO: 28.3 PG (ref 26–34)
MCHC RBC AUTO-ENTMCNC: 32.8 G/DL (ref 31–37)
MCV RBC AUTO: 86.3 FL (ref 80–100)
MONOCYTES # BLD AUTO: 0.51 X10(3) UL (ref 0.1–1)
MONOCYTES NFR BLD AUTO: 4 %
NEUTROPHILS # BLD AUTO: 11.03 X10 (3) UL (ref 1.5–7.7)
NEUTROPHILS # BLD AUTO: 11.03 X10(3) UL (ref 1.5–7.7)
NEUTROPHILS NFR BLD AUTO: 86.9 %
OSMOLALITY SERPL CALC.SUM OF ELEC: 294 MOSM/KG (ref 275–295)
PH UR STRIP.AUTO: 5.5 (ref 5–8)
PLATELET # BLD AUTO: 360 10(3)UL (ref 150–450)
POTASSIUM SERPL-SCNC: 3.9 MMOL/L (ref 3.5–5.1)
PROT SERPL-MCNC: 8.2 G/DL (ref 5.7–8.2)
PROT UR STRIP.AUTO-MCNC: NEGATIVE MG/DL
RBC # BLD AUTO: 4.1 X10(6)UL (ref 3.8–5.3)
RBC #/AREA URNS AUTO: >10 /HPF
SODIUM SERPL-SCNC: 139 MMOL/L (ref 136–145)
SP GR UR STRIP.AUTO: 1.01 (ref 1–1.03)
UROBILINOGEN UR STRIP.AUTO-MCNC: NORMAL MG/DL
WBC # BLD AUTO: 12.7 X10(3) UL (ref 4–11)
WBC #/AREA URNS AUTO: >50 /HPF
WBC CLUMPS UR QL AUTO: PRESENT /HPF

## 2025-08-11 PROCEDURE — 80053 COMPREHEN METABOLIC PANEL: CPT | Performed by: EMERGENCY MEDICINE

## 2025-08-11 PROCEDURE — 96365 THER/PROPH/DIAG IV INF INIT: CPT

## 2025-08-11 PROCEDURE — 87086 URINE CULTURE/COLONY COUNT: CPT | Performed by: EMERGENCY MEDICINE

## 2025-08-11 PROCEDURE — 99285 EMERGENCY DEPT VISIT HI MDM: CPT

## 2025-08-11 PROCEDURE — 99284 EMERGENCY DEPT VISIT MOD MDM: CPT

## 2025-08-11 PROCEDURE — 81001 URINALYSIS AUTO W/SCOPE: CPT | Performed by: EMERGENCY MEDICINE

## 2025-08-11 PROCEDURE — 85025 COMPLETE CBC W/AUTO DIFF WBC: CPT | Performed by: EMERGENCY MEDICINE

## 2025-08-14 ENCOUNTER — HOSPITAL ENCOUNTER (OUTPATIENT)
Facility: HOSPITAL | Age: 75
Setting detail: OBSERVATION
Discharge: HOME | End: 2025-08-15
Attending: EMERGENCY MEDICINE | Admitting: INTERNAL MEDICINE

## 2025-08-14 DIAGNOSIS — N30.01 ACUTE CYSTITIS WITH HEMATURIA: Primary | ICD-10-CM

## 2025-08-14 LAB
BILIRUB UR QL STRIP.AUTO: NEGATIVE
COLOR UR AUTO: YELLOW
GLUCOSE UR STRIP.AUTO-MCNC: NORMAL MG/DL
KETONES UR STRIP.AUTO-MCNC: NEGATIVE MG/DL
LEUKOCYTE ESTERASE UR QL STRIP.AUTO: 500
PH UR STRIP.AUTO: 6.5 (ref 5–8)
PROT UR STRIP.AUTO-MCNC: 20 MG/DL
RBC #/AREA URNS AUTO: >10 /HPF
SP GR UR STRIP.AUTO: 1.01 (ref 1–1.03)
UROBILINOGEN UR STRIP.AUTO-MCNC: NORMAL MG/DL
WBC #/AREA URNS AUTO: >50 /HPF
WBC CLUMPS UR QL AUTO: PRESENT /HPF

## 2025-08-14 PROCEDURE — 87186 SC STD MICRODIL/AGAR DIL: CPT | Performed by: EMERGENCY MEDICINE

## 2025-08-14 PROCEDURE — 87077 CULTURE AEROBIC IDENTIFY: CPT | Performed by: EMERGENCY MEDICINE

## 2025-08-14 PROCEDURE — 99285 EMERGENCY DEPT VISIT HI MDM: CPT

## 2025-08-14 PROCEDURE — 96365 THER/PROPH/DIAG IV INF INIT: CPT

## 2025-08-14 PROCEDURE — 87086 URINE CULTURE/COLONY COUNT: CPT | Performed by: EMERGENCY MEDICINE

## 2025-08-14 PROCEDURE — 81001 URINALYSIS AUTO W/SCOPE: CPT | Performed by: EMERGENCY MEDICINE

## 2025-08-14 RX ORDER — METOCLOPRAMIDE HYDROCHLORIDE 5 MG/ML
5 INJECTION INTRAMUSCULAR; INTRAVENOUS EVERY 8 HOURS PRN
Status: DISCONTINUED | OUTPATIENT
Start: 2025-08-14 | End: 2025-08-15

## 2025-08-14 RX ORDER — SODIUM PHOSPHATE, DIBASIC AND SODIUM PHOSPHATE, MONOBASIC 7; 19 G/230ML; G/230ML
1 ENEMA RECTAL ONCE AS NEEDED
Status: DISCONTINUED | OUTPATIENT
Start: 2025-08-14 | End: 2025-08-15

## 2025-08-14 RX ORDER — BISACODYL 10 MG
10 SUPPOSITORY, RECTAL RECTAL
Status: DISCONTINUED | OUTPATIENT
Start: 2025-08-14 | End: 2025-08-15

## 2025-08-14 RX ORDER — CHOLECALCIFEROL (VITAMIN D3) 50 MCG
2000 TABLET ORAL DAILY
Status: DISCONTINUED | OUTPATIENT
Start: 2025-08-15 | End: 2025-08-15

## 2025-08-14 RX ORDER — HYDROCHLOROTHIAZIDE 25 MG/1
25 TABLET ORAL DAILY
Status: DISCONTINUED | OUTPATIENT
Start: 2025-08-15 | End: 2025-08-15

## 2025-08-14 RX ORDER — ACETAMINOPHEN 500 MG
500 TABLET ORAL EVERY 4 HOURS PRN
Status: DISCONTINUED | OUTPATIENT
Start: 2025-08-14 | End: 2025-08-15

## 2025-08-14 RX ORDER — SODIUM CHLORIDE 9 MG/ML
INJECTION, SOLUTION INTRAVENOUS CONTINUOUS
Status: DISCONTINUED | OUTPATIENT
Start: 2025-08-14 | End: 2025-08-15

## 2025-08-14 RX ORDER — HEPARIN SODIUM 5000 [USP'U]/ML
5000 INJECTION, SOLUTION INTRAVENOUS; SUBCUTANEOUS EVERY 8 HOURS SCHEDULED
Status: DISCONTINUED | OUTPATIENT
Start: 2025-08-14 | End: 2025-08-15

## 2025-08-14 RX ORDER — ONDANSETRON 2 MG/ML
4 INJECTION INTRAMUSCULAR; INTRAVENOUS EVERY 6 HOURS PRN
Status: DISCONTINUED | OUTPATIENT
Start: 2025-08-14 | End: 2025-08-15

## 2025-08-14 RX ORDER — LEVOTHYROXINE SODIUM 25 UG/1
25 TABLET ORAL
Status: DISCONTINUED | OUTPATIENT
Start: 2025-08-15 | End: 2025-08-15

## 2025-08-14 RX ORDER — BENZONATATE 200 MG/1
200 CAPSULE ORAL 3 TIMES DAILY PRN
Status: DISCONTINUED | OUTPATIENT
Start: 2025-08-14 | End: 2025-08-15

## 2025-08-14 RX ORDER — POLYETHYLENE GLYCOL 3350 17 G/17G
17 POWDER, FOR SOLUTION ORAL DAILY PRN
Status: DISCONTINUED | OUTPATIENT
Start: 2025-08-14 | End: 2025-08-15

## 2025-08-14 RX ORDER — PREDNISONE 5 MG/1
5 TABLET ORAL DAILY
Status: DISCONTINUED | OUTPATIENT
Start: 2025-08-14 | End: 2025-08-15

## 2025-08-14 RX ORDER — SENNOSIDES 8.6 MG
17.2 TABLET ORAL NIGHTLY PRN
Status: DISCONTINUED | OUTPATIENT
Start: 2025-08-14 | End: 2025-08-15

## 2025-08-15 ENCOUNTER — APPOINTMENT (OUTPATIENT)
Facility: HOSPITAL | Age: 75
End: 2025-08-15
Attending: INTERNAL MEDICINE

## 2025-08-15 VITALS
WEIGHT: 156.5 LBS | BODY MASS INDEX: 29 KG/M2 | RESPIRATION RATE: 21 BRPM | HEART RATE: 72 BPM | DIASTOLIC BLOOD PRESSURE: 57 MMHG | OXYGEN SATURATION: 98 % | TEMPERATURE: 98 F | SYSTOLIC BLOOD PRESSURE: 132 MMHG

## 2025-08-15 LAB
ANION GAP SERPL CALC-SCNC: 11 MMOL/L (ref 0–18)
BASOPHILS # BLD AUTO: 0.03 X10(3) UL (ref 0–0.2)
BASOPHILS NFR BLD AUTO: 0.2 %
BUN BLD-MCNC: 23 MG/DL (ref 9–23)
CALCIUM BLD-MCNC: 8.9 MG/DL (ref 8.7–10.6)
CHLORIDE SERPL-SCNC: 107 MMOL/L (ref 98–112)
CO2 SERPL-SCNC: 26 MMOL/L (ref 21–32)
CREAT BLD-MCNC: 0.98 MG/DL (ref 0.55–1.02)
EGFRCR SERPLBLD CKD-EPI 2021: 60 ML/MIN/1.73M2 (ref 60–?)
EOSINOPHIL # BLD AUTO: 0.24 X10(3) UL (ref 0–0.7)
EOSINOPHIL NFR BLD AUTO: 1.7 %
ERYTHROCYTE [DISTWIDTH] IN BLOOD BY AUTOMATED COUNT: 13.1 %
GLUCOSE BLD-MCNC: 91 MG/DL (ref 70–99)
HCT VFR BLD AUTO: 30.9 % (ref 35–48)
HGB BLD-MCNC: 10.3 G/DL (ref 12–16)
IMM GRANULOCYTES # BLD AUTO: 0.06 X10(3) UL (ref 0–1)
IMM GRANULOCYTES NFR BLD: 0.4 %
LYMPHOCYTES # BLD AUTO: 1.75 X10(3) UL (ref 1–4)
LYMPHOCYTES NFR BLD AUTO: 12.2 %
MAGNESIUM SERPL-MCNC: 2 MG/DL (ref 1.6–2.6)
MCH RBC QN AUTO: 28.6 PG (ref 26–34)
MCHC RBC AUTO-ENTMCNC: 33.3 G/DL (ref 31–37)
MCV RBC AUTO: 85.8 FL (ref 80–100)
MONOCYTES # BLD AUTO: 1.28 X10(3) UL (ref 0.1–1)
MONOCYTES NFR BLD AUTO: 8.9 %
NEUTROPHILS # BLD AUTO: 11.03 X10 (3) UL (ref 1.5–7.7)
NEUTROPHILS # BLD AUTO: 11.03 X10(3) UL (ref 1.5–7.7)
NEUTROPHILS NFR BLD AUTO: 76.6 %
OSMOLALITY SERPL CALC.SUM OF ELEC: 301 MOSM/KG (ref 275–295)
PLATELET # BLD AUTO: 311 10(3)UL (ref 150–450)
POTASSIUM SERPL-SCNC: 3.8 MMOL/L (ref 3.5–5.1)
RBC # BLD AUTO: 3.6 X10(6)UL (ref 3.8–5.3)
SODIUM SERPL-SCNC: 144 MMOL/L (ref 136–145)
WBC # BLD AUTO: 14.4 X10(3) UL (ref 4–11)

## 2025-08-15 PROCEDURE — 80048 BASIC METABOLIC PNL TOTAL CA: CPT | Performed by: INTERNAL MEDICINE

## 2025-08-15 PROCEDURE — 36410 VNPNXR 3YR/> PHY/QHP DX/THER: CPT

## 2025-08-15 PROCEDURE — 85025 COMPLETE CBC W/AUTO DIFF WBC: CPT | Performed by: INTERNAL MEDICINE

## 2025-08-15 PROCEDURE — 83735 ASSAY OF MAGNESIUM: CPT | Performed by: INTERNAL MEDICINE

## 2025-08-15 RX ORDER — LEVOTHYROXINE SODIUM 25 UG/1
25 TABLET ORAL EVERY OTHER DAY
Status: SHIPPED | COMMUNITY
Start: 2025-08-16

## 2025-08-15 RX ORDER — LEVOTHYROXINE SODIUM 25 UG/1
25 TABLET ORAL EVERY OTHER DAY
Status: DISCONTINUED | OUTPATIENT
Start: 2025-08-16 | End: 2025-08-15

## 2025-08-15 RX ORDER — CEFEPIME 1 G/50ML
2 INJECTION, SOLUTION INTRAVENOUS EVERY 12 HOURS
Qty: 1400 ML | Refills: 0 | Status: SHIPPED | OUTPATIENT
Start: 2025-08-15 | End: 2025-08-22

## 2025-08-19 ENCOUNTER — LAB REQUISITION (OUTPATIENT)
Dept: LAB | Facility: HOSPITAL | Age: 75
End: 2025-08-19

## 2025-08-19 DIAGNOSIS — N30.00 ACUTE CYSTITIS WITHOUT HEMATURIA: ICD-10-CM

## 2025-08-19 LAB
ALBUMIN SERPL-MCNC: 4.5 G/DL (ref 3.2–4.8)
ALBUMIN/GLOB SERPL: 1.3 (ref 1–2)
ALP LIVER SERPL-CCNC: 63 U/L (ref 55–142)
ALT SERPL-CCNC: 18 U/L (ref 10–49)
ANION GAP SERPL CALC-SCNC: 13 MMOL/L (ref 0–18)
AST SERPL-CCNC: 20 U/L (ref ?–34)
BASOPHILS # BLD AUTO: 0.03 X10(3) UL (ref 0–0.2)
BASOPHILS NFR BLD AUTO: 0.2 %
BILIRUB SERPL-MCNC: 0.2 MG/DL (ref 0.2–1.1)
BUN BLD-MCNC: 25 MG/DL (ref 9–23)
CALCIUM BLD-MCNC: 10 MG/DL (ref 8.7–10.6)
CHLORIDE SERPL-SCNC: 102 MMOL/L (ref 98–112)
CO2 SERPL-SCNC: 24 MMOL/L (ref 21–32)
CREAT BLD-MCNC: 1.05 MG/DL (ref 0.55–1.02)
CRP SERPL-MCNC: <0.5 MG/DL (ref ?–0.5)
EGFRCR SERPLBLD CKD-EPI 2021: 55 ML/MIN/1.73M2 (ref 60–?)
EOSINOPHIL # BLD AUTO: 0.19 X10(3) UL (ref 0–0.7)
EOSINOPHIL NFR BLD AUTO: 1.5 %
ERYTHROCYTE [DISTWIDTH] IN BLOOD BY AUTOMATED COUNT: 13.1 %
ERYTHROCYTE [SEDIMENTATION RATE] IN BLOOD: 35 MM/HR (ref 0–30)
GLOBULIN PLAS-MCNC: 3.4 G/DL (ref 2–3.5)
GLUCOSE BLD-MCNC: 119 MG/DL (ref 70–99)
HCT VFR BLD AUTO: 33 % (ref 35–48)
HGB BLD-MCNC: 11.3 G/DL (ref 12–16)
IMM GRANULOCYTES # BLD AUTO: 0.1 X10(3) UL (ref 0–1)
IMM GRANULOCYTES NFR BLD: 0.8 %
LYMPHOCYTES # BLD AUTO: 1.66 X10(3) UL (ref 1–4)
LYMPHOCYTES NFR BLD AUTO: 13.2 %
MCH RBC QN AUTO: 29 PG (ref 26–34)
MCHC RBC AUTO-ENTMCNC: 34.2 G/DL (ref 31–37)
MCV RBC AUTO: 84.8 FL (ref 80–100)
MONOCYTES # BLD AUTO: 0.94 X10(3) UL (ref 0.1–1)
MONOCYTES NFR BLD AUTO: 7.5 %
NEUTROPHILS # BLD AUTO: 9.66 X10 (3) UL (ref 1.5–7.7)
NEUTROPHILS # BLD AUTO: 9.66 X10(3) UL (ref 1.5–7.7)
NEUTROPHILS NFR BLD AUTO: 76.8 %
OSMOLALITY SERPL CALC.SUM OF ELEC: 294 MOSM/KG (ref 275–295)
PLATELET # BLD AUTO: 362 10(3)UL (ref 150–450)
POTASSIUM SERPL-SCNC: 4 MMOL/L (ref 3.5–5.1)
PROT SERPL-MCNC: 7.9 G/DL (ref 5.7–8.2)
RBC # BLD AUTO: 3.89 X10(6)UL (ref 3.8–5.3)
SODIUM SERPL-SCNC: 139 MMOL/L (ref 136–145)
WBC # BLD AUTO: 12.6 X10(3) UL (ref 4–11)

## 2025-08-19 PROCEDURE — 80053 COMPREHEN METABOLIC PANEL: CPT

## 2025-08-19 PROCEDURE — 86140 C-REACTIVE PROTEIN: CPT | Performed by: INTERNAL MEDICINE

## 2025-08-19 PROCEDURE — 85652 RBC SED RATE AUTOMATED: CPT | Performed by: INTERNAL MEDICINE

## 2025-08-19 PROCEDURE — 86140 C-REACTIVE PROTEIN: CPT

## 2025-08-19 PROCEDURE — 85025 COMPLETE CBC W/AUTO DIFF WBC: CPT

## 2025-08-19 PROCEDURE — 85652 RBC SED RATE AUTOMATED: CPT

## 2025-08-19 PROCEDURE — 80053 COMPREHEN METABOLIC PANEL: CPT | Performed by: INTERNAL MEDICINE

## 2025-08-19 PROCEDURE — 85025 COMPLETE CBC W/AUTO DIFF WBC: CPT | Performed by: INTERNAL MEDICINE

## (undated) DEVICE — 10FT COMBINED O2 DELIVERY/CO2 MONITORING. FILTER WITH MICROSTREAM TYPE LUER: Brand: DUAL ADULT NASAL CANNULA

## (undated) DEVICE — V2 SPECIMEN COLLECTION MANIFOLD KIT: Brand: NEPTUNE

## (undated) DEVICE — BALLOON HEMOSTATIC EUS LINEAR

## (undated) DEVICE — KIT CUSTOM ENDOPROCEDURE STERIS

## (undated) DEVICE — 3M™ RED DOT™ MONITORING ELECTRODE WITH FOAM TAPE AND STICKY GEL, 50/BAG, 20/CASE, 72/PLT 2570: Brand: RED DOT™

## (undated) DEVICE — 1200CC GUARDIAN II: Brand: GUARDIAN

## (undated) DEVICE — ECHOTIP PROCORE, HD ULTRASOUND BIOPSY NEEDLE: Brand: ECHOTIP PROCORE

## (undated) DEVICE — GLOVE,SURG,SENSICARE,ALOE,LF,PF,7: Brand: MEDLINE

## (undated) DEVICE — GIJAW SINGLE-USE BIOPSY FORCEPS WITH NEEDLE: Brand: GIJAW

## (undated) NOTE — LETTER
[place] [facility]-Admission [facility] Department  Phone: [phone]  Right Fax: [phone]  CLARIFICATION FOR E-SSS    Sent By:   [id number] Date: 2025     Patient Name: Dorina Garcia  Surgery Date: 2025  CSN: [id number]     Medical Record: [id number]  : 1950      Age: 74 year old        Sex: female    Surgeons and Role:     * Kevin Gonzalez MD - Primary  FAX: [phone]  Procedure Comments:  UPPER ENDOSCOPIC ULTRASOUND (EUS) WITH FINE NEEDLE ASPIRATION    YOU SHOULD RECEIVE 1 PAGES (INCLUDING COVER SHEET)    Please note that clarification is needed on the Electronic-Surgery Scheduling Sheet (E-SSS)  the original is included with this letter. Please have physician review and make changes on the faxed copy of the E-SSS.    Dr Gonzalez needs to check the order box on SSS.     Please review and submit change if needed        ALL CHANGES MUST BE DOCUMENTED ON THE E-SSS AND SIGNED BY THE PHYSICIAN    After physician has made the changes and signed the E-SSS please fax it to [phone] and these changes will then be made in Epic by the OR schedulers.     If you have any questions please call Pre-Admission Testing at [phone]    Thank you,    Pre-Admission Testing    [address]  [address]  Phone: [phone]  Fax: [phone]  [email or web address]    STATEMENT OF CONFIDENTIALITY: This transmittal is intended only for the use of the individual entity to which is addressed and may contain information that is privileged and confidential. information contained. If the reader of this message IS NOT the intended recipient, you are hereby notified that any disclosure, distribution or copying of this information is strictly prohibited. If you have received this transmission in error, please notify us immediately by telephone and return the original documents to us at the above address via the [place] Postal Service. Thank you.